# Patient Record
Sex: MALE | Race: WHITE
[De-identification: names, ages, dates, MRNs, and addresses within clinical notes are randomized per-mention and may not be internally consistent; named-entity substitution may affect disease eponyms.]

---

## 2014-06-17 VITALS
SYSTOLIC BLOOD PRESSURE: 145 MMHG | DIASTOLIC BLOOD PRESSURE: 87 MMHG | DIASTOLIC BLOOD PRESSURE: 87 MMHG | SYSTOLIC BLOOD PRESSURE: 145 MMHG

## 2017-08-30 ENCOUNTER — HOSPITAL ENCOUNTER (OUTPATIENT)
Dept: HOSPITAL 61 - KCIC | Age: 57
Discharge: HOME | End: 2017-08-30
Attending: FAMILY MEDICINE
Payer: COMMERCIAL

## 2017-08-30 DIAGNOSIS — M75.32: Primary | ICD-10-CM

## 2017-08-30 PROCEDURE — 70551 MRI BRAIN STEM W/O DYE: CPT

## 2017-08-30 PROCEDURE — 73030 X-RAY EXAM OF SHOULDER: CPT

## 2017-08-30 NOTE — [facility]
MRI Brain without contrast

 

History: Left arm weakness in recent weeks

 

Technique: Multiplanar, multisequential noncontrast MR imaging was 

performed of the brain.

 

Contrast:  None

 

Comparison: None

 

Findings: There is no evidence of an acute infarct or cytotoxic edema. The

ventricles, sulci, and cisterns are within normal limits in size and 

configuration. There is no significant midline shift, mass effect, or 

focal abnormal extra-axial fluid collection. There is a tiny 4 mm focus of

T2 and FLAIR hyperintense signal of the right frontal peripheral deep 

white matter, otherwise no significant focal signal abnormality including 

hemosiderin deposition of the brain parenchyma. There is preservation of 

the major intracranial flow-voids at the skull base. The mastoid air cells

are aerated. The cerebellar tonsils are normal in location. There is no 

significant abnormality of the pineal gland or pituitary gland.  There is 

patchy mild bilateral ethmoid air cell and very minimal frontal sinus 

mucosal thickening. There is right maxillary sinus mucous retention cyst 

1.5 cm. There is preserved marrow signal of the clivus.

 

 

Impression:

1. There is no evidence of recent infarct or intracranial mass effect. 

Other than a tiny focus of likely nonspecific gliosis right frontal white 

matter, there is no significant intracranial abnormality.

 

Electronically signed by: Reagan Haddad MD (8/30/2017 9:16 AM) 

[facility]

## 2017-08-30 NOTE — [facility]
SHOULDER 2+V LEFT

 

History: Tendinitis of left rotator cuff

 

Comparison: None.

 

Findings:

3 views of the left shoulder submitted. There is no acute fracture or 

dislocation. There apparently is a small margin of calcification of the 

superior lateral margin of the glenoid.

 

Impression: 

 

1.  No acute osseous abnormality is identified. 

 

Electronically signed by: Reagan Haddad MD (8/30/2017 11:20 AM) 

[facility]

## 2018-12-17 ENCOUNTER — HOSPITAL ENCOUNTER (OUTPATIENT)
Dept: HOSPITAL 61 - KCIC MRI | Age: 58
Discharge: HOME | End: 2018-12-17
Attending: ORTHOPAEDIC SURGERY
Payer: COMMERCIAL

## 2018-12-17 DIAGNOSIS — M75.121: Primary | ICD-10-CM

## 2018-12-17 DIAGNOSIS — M19.012: ICD-10-CM

## 2018-12-17 DIAGNOSIS — M75.112: ICD-10-CM

## 2018-12-17 DIAGNOSIS — M19.011: ICD-10-CM

## 2018-12-17 PROCEDURE — 73221 MRI JOINT UPR EXTREM W/O DYE: CPT

## 2018-12-17 NOTE — [facility]
MR of the right shoulder

 

Indication: Right shoulder pain for 14 months..

Comparison: None are available.

Technique: Standard multiplanar sequences are obtained.

 

Findings:

Artifact: No significant image degradation.

 

Acromioclavicular joint: Mildly degenerative. Mild undersurface mass 

effect.

 

Rotator cuff:

*  Supraspinatus-infraspinatus tendon: Linear full-thickness tear across 

the supraspinatus tendon footplate attachment, measures about 15 mm AP 

diameter. No significant retraction. More generalized tendinosis.

*  Subscapularis tendon: Tendinosis with mild partial tear.

*  Muscle bulk: No advanced atrophy.

*  Subacromial subdeltoid bursa: Trace effusion.

 

Fluid: No significant glenohumeral effusion.

Glenohumeral cartilage: No acute defect or advanced DJD.

Labrum: Mild signal within the superior labrum, compatible with 

degeneration or small degenerative tear.

Biceps tendon: Tendinosis, with thickening and hyperintense signal.

 

Bones: No lesion or acute fracture.

Soft tissue: No acute findings..

 

Impression:

1. Rotator cuff tendinosis, small nonretracted full-thickness tear of the 

supraspinatus tendon attachment, mild partial subscapularis tendon tear.

2. Superior labral degeneration versus mild degenerative tear.

3. Biceps tendinosis.

 

Electronically signed by: Bentley Currie MD (12/17/2018 5:02 PM) [facility]

## 2018-12-17 NOTE — [facility]
MR of the left shoulder

 

Indication: Left shoulder pain, possible rotator cuff tear, pain for 14 

months..

Comparison: None are available.

Technique: Standard multiplanar sequences are obtained.

 

Findings:

Artifact: Mild motion degradation.

 

Acromioclavicular joint: Mildly degenerative with mild undersurface mass 

effect.

 

Rotator cuff:

*  Supraspinatus-infraspinatus tendon: Partial-thickness undersurface tear

of the supraspinatus tendon, 30-50% deep. There is also a small bursal 

surface defect of the supraspinatus tendon. No full-thickness rupture or 

retraction.

*  Subscapularis tendon: Tendinosis with partial tear

*  Muscle bulk: Within normal limits

*  Subacromial subdeltoid bursa: Trace effusion.

 

Fluid: Trace glenohumeral effusion.

Glenohumeral cartilage: No acute defect or advanced DJD.

Labrum: Mild superior labral signal. No definite attachment but detection 

could be limited by motion.

Biceps tendon: Tendinosis with mild thickening and signal.

 

Bones: No lesion or acute fracture.

Soft tissue: No acute findings..

 

Impression: 

1 Partial tearing of the supraspinatus tendon, and to a lesser extent 

subscapularis tendon. No full-thickness rupture or retraction.

2. Biceps tendinosis.

 

Electronically signed by: Bentley Currie MD (12/17/2018 4:57 PM) [facility]

## 2019-02-06 ENCOUNTER — HOSPITAL ENCOUNTER (OUTPATIENT)
Dept: HOSPITAL 61 - SURG | Age: 59
Discharge: HOME | End: 2019-02-06
Attending: ORTHOPAEDIC SURGERY
Payer: COMMERCIAL

## 2019-02-06 VITALS — BODY MASS INDEX: 27.77 KG/M2 | HEIGHT: 70 IN | WEIGHT: 194 LBS

## 2019-02-06 VITALS — DIASTOLIC BLOOD PRESSURE: 78 MMHG | SYSTOLIC BLOOD PRESSURE: 150 MMHG

## 2019-02-06 DIAGNOSIS — Z91.041: ICD-10-CM

## 2019-02-06 DIAGNOSIS — Z82.49: ICD-10-CM

## 2019-02-06 DIAGNOSIS — M24.111: ICD-10-CM

## 2019-02-06 DIAGNOSIS — Z79.899: ICD-10-CM

## 2019-02-06 DIAGNOSIS — Z80.0: ICD-10-CM

## 2019-02-06 DIAGNOSIS — M75.121: Primary | ICD-10-CM

## 2019-02-06 DIAGNOSIS — Z87.891: ICD-10-CM

## 2019-02-06 DIAGNOSIS — Z98.890: ICD-10-CM

## 2019-02-06 DIAGNOSIS — J45.909: ICD-10-CM

## 2019-02-06 PROCEDURE — 29827 SHO ARTHRS SRG RT8TR CUF RPR: CPT

## 2019-02-06 PROCEDURE — A7015 AEROSOL MASK USED W NEBULIZE: HCPCS

## 2019-02-06 PROCEDURE — C1782 MORCELLATOR: HCPCS

## 2019-02-06 RX ADMIN — FENTANYL CITRATE PRN MCG: 50 INJECTION INTRAMUSCULAR; INTRAVENOUS at 09:10

## 2019-02-06 RX ADMIN — FENTANYL CITRATE PRN MCG: 50 INJECTION INTRAMUSCULAR; INTRAVENOUS at 09:05

## 2019-02-06 NOTE — [facility]
Operative Note


Operative Note


Date of procedure: 2/6/2019





Surgeon: Devon Suggs





Assistant: Kathy Soriano, certified surgical first assistant





Preoperative diagnosis:  Right shoulder rotator cuff tear





Postoperative diagnosis: Same





Procedure performed:  arthroscopic right shoulder rotator cuff repair





Anesthesia: Gen. plus regional nerve block





Findings:


#1 near complete rotator cuff tear involving supraspinatus and leading edge of 

the infraspinatus


#2 remainder rotator cuff intact


#3 grade 2-3 changes at  glenoid and normal appearinghumeral head


#4 superior labral degenerative fraying


#5 remainder of the labrum intact


#6 no loose bodies





Blood loss: 10mL





Components inserted: [employer] helacoil 2 for rotator cuff repair





Reason for procedure: Patient is a very pleasant and gentleman who has had 

chronic shoulder pain 4 years. Clinical and radiographic examination, including 

MRI, were consistent with the preoperative diagnosis. He has tried physical 

therapy for several months as well as injections which only afforded him 

temporary relief. Because of his symptoms and dysfunction, we had a discussion 

of the risks, benefits, alternatives the above surgery and he wished to proceed.


Description of procedure: Patient was greeted in the preoperative holding area 

where the correct extremity was verified and marked. They  were taken to the 

preoperative holding area where the anesthesiology team placed a regional nerve 

block. The patient was then taken back to the operative suite and antibiotics 

were started as they were brought back. Once in the operative room, the patient 

was transferred gently supine to the operating room table after successful 

induction of a general anesthetic. After this, he was sat up in a beachchair 

position maintaining his C-spine in neutral position, large pad under his legs, 

he was secured to the bed. We then prepped and draped  his right upper 

extremity and shoulder girdle in our usual sterile fashion,  we conducted our 

standard preoperative timeout. I palpated and marked surface anatomy for my 

planned portal sites. I then used a spinal needle to localize a posterior 

superior portal and incised skin in accordance with this. After this, I 

introduced the blunt arthroscopic trocar into the glenohumeral joint followed 

by the camera. I used a spinal needle to localize an anterosuperior portal and 

incised skin in accordance with this.  I then introducedmy arthroscopic probe 

and conducted my diagnostic arthroscopy with the above-noted findings. After 

this, I debrided some frayed tissue around the articular side of the rotator 

cuff as well as at thesuperior labrum. I next identified his rotator cuff tear 

and used a spinal needle to shuttle a PDS suture through the tendon tear to 

make sure I could visualize this portion from the subacromial space. After this

, I repositioned the camera into the subacromial space and performed a 

bursectomy with combination of shaver and electrocautery device. I then 

identified the rotator cuff tear and was easily able to violate the substance 

of the tendon from the bursal side with the shaver. I debrided the pathologic 

tendon and prepared my footprint. I then placed my 2 helicoil anchors and 

shuttled limbs through in a simple configuration. I tied these down with 

arthroscopic knot-tying techniques. The tear was stable to probing and to 

gentle rotation of the arm. I then removed all loose bony debris and the excess 

arthroscopic fluid. I took my final pictures prior to this. After this, all the 

excess fluid and instrumentation was removed. The portals were closed with 

simple interrupted 3-0 nylon. Sterile dressing was applied followed by an 

abduction pillow sling. Patient tolerated surgery well. No complications. All 

counts correct 2 prior to wound closure. At the conclusion, he was laid supine 

and transferred gently supine to the recovery room cart and taken to the PACU 

in a stable and extubated condition. Postoperative plan is discharge him home, 

nonweightbearing for 6 weeks. Well get him started on physical therapy. He 

will follow up with me in 2 weeks, sooner should a problem arise.











DEVON SUGGS II, MD Feb 6, 2019 09:10

## 2019-02-06 NOTE — [facility]
DISCHARGE INSTRUCTIONS


Condition on Discharge


Condition on Discharge:  Stable





Activity After Discharge


Activity Instructions for Disc:  Avoid exertion, Bedrest today, Progressive 

ambulation, Walk in house, Other, see below


Other activity instructions:  arm to remain in sling


Bathing Instructions:  Shower-keep dressing dry, No Tub Bath until see 


Lifting Instructions after Dis:  No heavy lifting, No pulling or pushing, Do 

not lift >10 pounds


Exercise Instruction after Dis:  Walk 10 min, 3 x per day, Walk 15 min, 3 x per 

day, Walk 30 min, 3 x per week


Driving Instructions after Dis:  Do not drive


Weight Bearing Status after Di:  Non weight bearing





Diet after Discharge


Diet after Discharge:  Regular


Diet Texture:  Regular





Wound Incision Care


Wound/Incision Care:  Ice to area for comfort, Keep wound/cast CDI, Change 

dressing


Wound Care Equipment:  Dressings





Contacting the [facility] after DC


Call your doctor for:  Concerns you may have





Follow-Up


Follow up with:  Pablo in 2 wks











CHENCHO SUGGS II, MD Feb 6, 2019 07:43